# Patient Record
Sex: FEMALE | Race: WHITE | URBAN - METROPOLITAN AREA
[De-identification: names, ages, dates, MRNs, and addresses within clinical notes are randomized per-mention and may not be internally consistent; named-entity substitution may affect disease eponyms.]

---

## 2018-02-16 ENCOUNTER — OFFICE VISIT (OUTPATIENT)
Dept: FAMILY MEDICINE CLINIC | Age: 28
End: 2018-02-16

## 2018-02-16 VITALS
TEMPERATURE: 98.1 F | BODY MASS INDEX: 18.61 KG/M2 | OXYGEN SATURATION: 98 % | HEIGHT: 64 IN | RESPIRATION RATE: 18 BRPM | DIASTOLIC BLOOD PRESSURE: 62 MMHG | SYSTOLIC BLOOD PRESSURE: 96 MMHG | HEART RATE: 70 BPM | WEIGHT: 109 LBS

## 2018-02-16 DIAGNOSIS — Z00.00 WELL WOMAN EXAM WITHOUT GYNECOLOGICAL EXAM: Primary | ICD-10-CM

## 2018-02-16 NOTE — PROGRESS NOTES
Jack Keller [address] [place] [address] Visit   02/16/2018  Patient ID: Malik Montgomery is a 32 y.o. female. Assessment/Plan:    Diagnoses and all orders for this visit:    1. Well woman exam without gynecological exam  #Healthcare maintenance/ Preventive:  - screened for alcohol abuse, counseled if heavy use   - screened for depression  - screened for tobacco use, counseled if active  - HM reviewed and updated as noted. - discussed diet and exercise  - recommended dental and vision screenings  - reviewed sexual history      Counselled pt on Patient health concerns and plans. Patient was offered a choice/choices in the treatment plan today. Reviewed return precautions as appropriate. Patient expresses understanding of the plan and agrees with recommendations. See patient instructions for more. Patient Instructions     . TODAY, please go to:     [address], pap smear     Please schedule the following appointments at [address] [place] [address]:  · Complete Physical Exam and lab follow up with Dr. Mannie Cho in Feb 2019   · Lab visit, fasting, the week before our visit.       Today's Plan:      Decrease your risk for heart attack and stroke    Better Blood Pressure  Be Active  Better Cholesterol  Eat better  Better Blood sugar  Lose weight   Stop smoking                 American Heart Association DASH  (Dietary Approaches to Stop Hypertension) Mediterranean   Eat Daily:   4+ fruit and vegetables  3+ fiber rich foods  < 1500mg sodium  <7% sat fat    Weekly:   2+ fish  4+ flat handfuls of nets  <36 oz sweetened beverages  <2 svgs processed meats   Daily:   7-8 grains and grain products  4-5 fruit   4-5 vegetable  2-3 low fat or non fat dairy  2 or less lean meat, fish, poultry  <2-4 fats and sweets    Weekly:   4-5 nuts, seeds, legumes   Whole grains  7-10 fruit and vegetables  Eat nuts  Low or non fat dairy  Red wine  Use olive or canola oil  Use spices rather than salt    Weekly:   Fish  2x  Poultry 2x    Monthly:   Red meat     Move 150 minutes of moderate activity per week Exercise Exercise    Other No tobacco  BMI<25  Total cholesterol <200  Blood Pressure <120/80  Fasting blood glucose <100   Weight loss  Stop smoking  Moderation of alcohol   For more information, go to: [email or web address]. [email or web address]-[email or web address]  [email or web address].org/[email or web address]   [email or web address]         Subjective:   HPI:  Chintan Jimenez is a 32 y.o. female being seen for:   Chief Complaint   Patient presents with   [address]  Past medical history, surgical history, social history, family history, medications, allergies reviewed and updated. Prior PCP: none    Complete Physical today  Past medical history, surgical history, social history, family history, medications, allergies reviewed and updated. reports that she has never smoked. She has never used smokeless tobacco.   reports that she drinks alcohol. · OB Hx/Menstrual Hx/Sexualhx:   reports that she does not currently engage in sexual activity. OB History    Para Term  AB Living   0 0 0 0 0 0   SAB TAB Ectopic Molar Multiple Live Births   0 0 0 0 0 0         Obstetric Comments   . Menarche: middle school   Flow: regular, monthly    H/o abnl pap: per pt, as of 2018 Yes. \"white cells\" \"did some tests\" ultimately okay. Has had normal since then   Paps with [address]     Patient's last menstrual period was 2018.       Health Maintenance  Health Maintenance Due   Topic Date Due    PAP AKA CERVICAL CYTOLOGY  10/18/2011     · Depression:     PHQ over the last two weeks 2018   Little interest or pleasure in doing things Not at all   Feeling down, depressed or hopeless Not at all   Total Score PHQ 2 0       Screening and Prevention Due:  Health Maintenance Due   Topic Date Due    PAP AKA CERVICAL CYTOLOGY  10/18/2011         Review of Systems  Otherwise as noted in HPI  ? Objective:     Visit Vitals    BP 96/62 (BP 1 Location: Left arm, BP Patient Position: Sitting)    Pulse 70    Temp 98.1 °F (36.7 °C) (Oral)    Resp 18    Ht 5' 4\" (1.626 m)    Wt 109 lb (49.4 kg)    SpO2 98%    BMI 18.71 kg/m2     Wt Readings from Last 3 Encounters:   02/16/18 109 lb (49.4 kg)     BP Readings from Last 3 Encounters:   02/16/18 96/62     PHQ over the last two weeks 2/16/2018   Little interest or pleasure in doing things Not at all   Feeling down, depressed or hopeless Not at all   Total Score PHQ 2 0         Physical Exam   Constitutional: She appears well-developed and well-nourished. No distress. HENT:   Right Ear: Tympanic membrane, external ear and ear canal normal.   Left Ear: Tympanic membrane, external ear and ear canal normal.   Mouth/Throat: Oropharynx is clear and moist. No oropharyngeal exudate. Eyes: Conjunctivae are normal. Pupils are equal, round, and reactive to light. Right eye exhibits no discharge. Left eye exhibits no discharge. No scleral icterus. Neck: Neck supple. No thyromegaly present. Cardiovascular: Normal rate, regular rhythm and normal heart sounds. Exam reveals no gallop and no friction rub. No murmur heard. Pulmonary/Chest: Effort normal and breath sounds normal. No respiratory distress. She has no decreased breath sounds. She has no wheezes. She has no rhonchi. She has no rales. Abdominal: Soft. Bowel sounds are normal. She exhibits no distension and no mass. There is no hepatosplenomegaly. There is no tenderness. There is no rigidity, no rebound and no guarding. Musculoskeletal: She exhibits no edema. Lymphadenopathy:     She has no cervical adenopathy. Right: No supraclavicular adenopathy present. Left: No supraclavicular adenopathy present. Neurological: She is alert.  She has normal strength and normal reflexes. No sensory deficit. She exhibits normal muscle tone. Skin: She is not diaphoretic. Psychiatric: She has a normal mood and affect. Her behavior is normal.       Allergies   Allergen Reactions    Pear Itching     Peaches, pineapple, plums. Corners of mouth start to itch     Prior to Admission medications    Medication Sig Start Date End Date Taking? Authorizing Provider   LACTOBACILLUS ACIDOPHILUS (PROBIOTIC PO) Take  by mouth. Yes Historical Provider   CETIRIZINE HCL (ZYRTEC PO) Take  by mouth. Yes Historical Provider     Patient Active Problem List   Diagnosis Code    Hypoglycemia E16.2    Environmental allergies Z91.09    Multiple food allergies Z91.018       . Social History     Social History    Marital status: UNKNOWN     Spouse name: N/A    Number of children: N/A    Years of education: N/A     Occupational History    Not on file. Social History Main Topics    Smoking status: Never Smoker    Smokeless tobacco: Never Used    Alcohol use Yes      Comment: occassionally     Drug use: No    Sexual activity: Not Currently     Other Topics Concern    Not on file     Social History Narrative    . Went to [employer] for [employer], then transferred to [employer]. Lives with mom and dad. Past Medical History:   Diagnosis Date    Environmental allergies     received allergy shots. stopped in college. doing well since then. takes zyrtec. pollen, dust, molds, bermuda grass, cats (can build tolerance), horses.  Hypoglycemia     if not enough protein or protein<sugar, gets headache, hunger, weak, irritability. improves with protein. manages with advil for headaches. can sometimes cause migraine. more susceptible with stress and menses.  Multiple food allergies     no h/o anaphylaxis.         Past Surgical History:   Procedure Laterality Date    HX TONSILLECTOMY  child    HX WISDOM TEETH EXTRACTION  2009       OB History  Para Term  AB Living    0 0 0 0 0 0    SAB TAB Ectopic Molar Multiple Live Births    0 0 0 0 0 0        Obstetric Comments    . Menarche: middle school  Flow: regular, monthly   H/o abnl pap: per pt, as of 2018 Yes. \"white cells\" \"did some tests\" ultimately okay.  Has had normal since then  Paps with [facility] [address] History   Problem Relation Age of Onset    Other Mother      hypoglycemia    Allergy-severe Father      improved with lifestyle changes    Attention Deficit Hyperactivity Disorder Sister     Other Sister      hypoglycemia    No Known Problems Brother     Colon Cancer Maternal Grandmother      35s    Other Maternal Grandfather      Sjogren's Syndrome    Dementia Maternal Grandfather      76s    Stroke Maternal Grandfather     Other Paternal Grandmother      sinus problems, arthritis    Diabetes Paternal Grandfather     Arthritis-osteo Paternal Rojean Lowell     Stroke Paternal Grandfather

## 2018-02-16 NOTE — MR AVS SNAPSHOT
[address] 
[address] [facility] [address] 
[phone] Patient: Dalia Rosario MRN: [id number] :1990 Visit Information Date & Time Provider Department Dept. Phone Encounter #  
 2018  3:40 PM Juanita Forbes. Blaze Irvin MD [facility] [phone] [id number] Upcoming Health Maintenance Date Due  
 PAP AKA CERVICAL CYTOLOGY 10/18/2011 DTaP/Tdap/Td series (2 - Td) 2028 Allergies as of 2018  Review Complete On: 2018 By: Juanita Forbes. Blaze Irvin MD  
  
 Severity Noted Reaction Type Reactions Pear  2018    Itching Peaches, pineapple, plums. Corners of mouth start to itch Current Immunizations  Never Reviewed No immunizations on file. Not reviewed this visit You Were Diagnosed With   
  
 Codes Comments Well woman exam without gynecological exam    -  Primary ICD-10-CM: Z00.00 ICD-9-CM: V70.0 Hypoglycemia     ICD-10-CM: E16.2 ICD-9-CM: 251.2 Environmental allergies     ICD-10-CM: Z91.09 
ICD-9-CM: V15.09   
 Multiple food allergies     ICD-10-CM: Z91.018 
ICD-9-CM: V15.05 Vitals BP Pulse Temp Resp Height(growth percentile) Weight(growth percentile) 96/62 (BP 1 Location: Left arm, BP Patient Position: Sitting) 70 98.1 °F (36.7 °C) (Oral) 18 5' 4\" (1.626 m) 109 lb (49.4 kg) LMP SpO2 BMI Smoking Status 2018 98% 18.71 kg/m2 Never Smoker BMI and BSA Data Body Mass Index Body Surface Area 18.71 kg/m 2 1.49 m 2 Preferred Pharmacy Pharmacy Name Phone [id number] [address] [phone] Your Updated Medication List  
  
   
This list is accurate as of: 18  5:12 PM.  Always use your most recent med list.  
  
  
  
  
 PROBIOTIC PO Take  by mouth. ZYRTEC PO Take  by mouth. Patient Instructions Mimi Mckeon TODAY, please go to: CHECK OUT Release of records- OBGYN, pap smear Please schedule the following appointments at [address] [place] [address]: 
· Complete Physical Exam and lab follow up with Dr. Yeyo Zamora in Feb 2019 · Lab visit, fasting, the week before our visit. Today's Plan: 
 
 
Decrease your risk for heart attack and stroke Better Blood Pressure Be Active Better Cholesterol Eat better Better Blood sugar Lose weight Stop smoking American Heart Association DASH 
(Dietary Approaches to Stop Hypertension) Mediterranean Eat Daily:  
4+ fruit and vegetables 3+ fiber rich foods < 1500mg sodium 
<7% sat fat Weekly:  
2+ fish 4+ flat handfuls of nets 
<36 oz sweetened beverages <2 svgs processed meats Daily:  
7-8 grains and grain products 4-5 fruit 4-5 vegetable 2-3 low fat or non fat dairy 2 or less lean meat, fish, poultry <2-4 fats and sweets Weekly:  
4-5 nuts, seeds, legumes Whole grains 7-10 fruit and vegetables Eat nuts Low or non fat dairy Red wine Use olive or canola oil Use spices rather than salt Weekly:  
Fish  2x Poultry 2x Monthly:  
Red meat Move 150 minutes of moderate activity per week Exercise Exercise Other No tobacco 
BMI<25 Total cholesterol <200 Blood Pressure <120/80 Fasting blood glucose <100 Weight loss Stop smoking Moderation of alcohol For more information, go to: [email or web address].App Partner.[email or web address]. WX-bGhXyuCg 
[email or web address].org/default. asp  
[email or web address].de Well Visit, Ages 25 to 48: Care Instructions Your Care Instructions Physical exams can help you stay healthy. Your doctor has checked your overall health and may have suggested ways to take good care of yourself. He or she also may have recommended tests. At home, you can help prevent illness with healthy eating, regular exercise, and other steps. Follow-up care is a key part of your treatment and safety. Be sure to make and go to all appointments, and call your doctor if you are having problems. It's also a good idea to know your test results and keep a list of the medicines you take. How can you care for yourself at home? · Reach and stay at a healthy weight. This will lower your risk for many problems, such as obesity, diabetes, heart disease, and high blood pressure. · Get at least 30 minutes of physical activity on most days of the week. Walking is a good choice. You also may want to do other activities, such as running, swimming, cycling, or playing tennis or team sports. Discuss any changes in your exercise program with your doctor. · Do not smoke or allow others to smoke around you. If you need help quitting, talk to your doctor about stop-smoking programs and medicines. These can increase your chances of quitting for good. · Talk to your doctor about whether you have any risk factors for sexually transmitted infections (STIs). Having one sex partner (who does not have STIs and does not have sex with anyone else) is a good way to avoid these infections. · Use birth control if you do not want to have children at this time. Talk with your doctor about the choices available and what might be best for you. · Protect your skin from too much sun. When you're outdoors from 10 a.m. to 4 p.m., stay in the shade or cover up with clothing and a hat with a wide brim. Wear sunglasses that block UV rays. Even when it's cloudy, put broad-spectrum sunscreen (SPF 30 or higher) on any exposed skin. · See a dentist one or two times a year for checkups and to have your teeth cleaned. · Wear a seat belt in the car. · Drink alcohol in moderation, if at all.  That means no more than 2 drinks a day for men and 1 drink a day for women. Follow your doctor's advice about when to have certain tests. These tests can spot problems early. For everyone · Cholesterol. Have the fat (cholesterol) in your blood tested after age 21. Your doctor will tell you how often to have this done based on your age, family history, or other things that can increase your risk for heart disease. · Blood pressure. Have your blood pressure checked during a routine doctor visit. Your doctor will tell you how often to check your blood pressure based on your age, your blood pressure results, and other factors. · Vision. Talk with your doctor about how often to have a glaucoma test. 
· Diabetes. Ask your doctor whether you should have tests for diabetes. · Colon cancer. Have a test for colon cancer at age 48. You may have one of several tests. If you are younger than 48, you may need a test earlier if you have any risk factors. Risk factors include whether you already had a precancerous polyp removed from your colon or whether your parent, brother, sister, or child has had colon cancer. For women · Breast exam and mammogram. Talk to your doctor about when you should have a clinical breast exam and a mammogram. Medical experts differ on whether and how often women under 50 should have these tests. Your doctor can help you decide what is right for you. · Pap test and pelvic exam. Begin Pap tests at age 24. A Pap test is the best way to find cervical cancer. The test often is part of a pelvic exam. Ask how often to have this test. 
· Tests for sexually transmitted infections (STIs). Ask whether you should have tests for STIs. You may be at risk if you have sex with more than one person, especially if your partners do not wear condoms. For men · Tests for sexually transmitted infections (STIs). Ask whether you should have tests for STIs.  You may be at risk if you have sex with more than one person, especially if you do not wear a condom. · Testicular cancer exam. Ask your doctor whether you should check your testicles regularly. · Prostate exam. Talk to your doctor about whether you should have a blood test (called a PSA test) for prostate cancer. Experts differ on whether and when men should have this test. Some experts suggest it if you are older than 39 and are -American or have a father or brother who got prostate cancer when he was younger than 72. When should you call for help? Watch closely for changes in your health, and be sure to contact your doctor if you have any problems or symptoms that concern you. Where can you learn more? Go to http://josé manuel-nigel.info/. Enter P072 in the search box to learn more about \"Well Visit, Ages 25 to 48: Care Instructions. \" Current as of: May 12, 2017 Content Version: 11.4 © 2859-1481 [employer]. Care instructions adapted under license by [employer] (which disclaims liability or warranty for this information). If you have questions about a medical condition or this instruction, always ask your healthcare professional. [address] any warranty or liability for your use of this information. Introducing hospitals & HEALTH SERVICES! Lolly Chun introduces Valopaa patient portal. Now you can access parts of your medical record, email your doctor's office, and request medication refills online. 1. In your internet browser, go to [email or web address] 2. Click on the First Time User? Click Here link in the Sign In box. You will see the New Member Sign Up page. 3. Enter your Valopaa Access Code exactly as it appears below. You will not need to use this code after youve completed the sign-up process. If you do not sign up before the expiration date, you must request a new code. · Valopaa Access Code: [email or web address] Expires: 5/17/2018  4:11 PM 
 
 4. Enter the last four digits of your Social Security Number (xxxx) and Date of Birth (mm/dd/yyyy) as indicated and click Submit. You will be taken to the next sign-up page. 5. Create a [email or web address] ID. This will be your [email or web address] login ID and cannot be changed, so think of one that is secure and easy to remember. 6. Create a [email or web address] password. You can change your password at any time. 7. Enter your Password Reset Question and Answer. This can be used at a later time if you forget your password. 8. Enter your e-mail address. You will receive e-mail notification when new information is available in [address] 9. Click Sign Up. You can now view and download portions of your medical record. 10. Click the Download Summary menu link to download a portable copy of your medical information. If you have questions, please visit the Frequently Asked Questions section of the [email or web address] website. Remember, [email or web address] is NOT to be used for urgent needs. For medical emergencies, dial 911. Now available from your iPhone and Android! Please provide this summary of care documentation to your next provider. Your primary care clinician is listed as Yissel Coello. If you have any questions after today's visit, please call [phone].

## 2018-02-16 NOTE — PATIENT INSTRUCTIONS
Wallace Kumar TODAY, please go to:     [address], pap smear     Please schedule the following appointments at [address] [place] [address]:  · Complete Physical Exam and lab follow up with Dr. Harsh Malin in Feb 2019   · Lab visit, fasting, the week before our visit. Today's Plan:      Decrease your risk for heart attack and stroke    Better Blood Pressure  Be Active  Better Cholesterol  Eat better  Better Blood sugar  Lose weight   Stop smoking                 American Heart Association DASH  (Dietary Approaches to Stop Hypertension) Mediterranean   Eat Daily:   4+ fruit and vegetables  3+ fiber rich foods  < 1500mg sodium  <7% sat fat    Weekly:   2+ fish  4+ flat handfuls of nets  <36 oz sweetened beverages  <2 svgs processed meats   Daily:   7-8 grains and grain products  4-5 fruit   4-5 vegetable  2-3 low fat or non fat dairy  2 or less lean meat, fish, poultry  <2-4 fats and sweets    Weekly:   4-5 nuts, seeds, legumes   Whole grains  7-10 fruit and vegetables  Eat nuts  Low or non fat dairy  Red wine  Use olive or canola oil  Use spices rather than salt    Weekly:   Fish  2x  Poultry 2x    Monthly:   Red meat     Move 150 minutes of moderate activity per week Exercise Exercise    Other No tobacco  BMI<25  Total cholesterol <200  Blood Pressure <120/80  Fasting blood glucose <100   Weight loss  Stop smoking  Moderation of alcohol   For more information, go to: [email or web address]  [email or web address].[email or web address]   [email or web address]            Well Visit, Ages 25 to 48: Care Instructions  Your Care Instructions    Physical exams can help you stay healthy. Your doctor has checked your overall health and may have suggested ways to take good care of yourself. He or she also may have recommended tests.  At home, you can help prevent illness with healthy eating, regular exercise, and other steps. Follow-up care is a key part of your treatment and safety. Be sure to make and go to all appointments, and call your doctor if you are having problems. It's also a good idea to know your test results and keep a list of the medicines you take. How can you care for yourself at home? · Reach and stay at a healthy weight. This will lower your risk for many problems, such as obesity, diabetes, heart disease, and high blood pressure. · Get at least 30 minutes of physical activity on most days of the week. Walking is a good choice. You also may want to do other activities, such as running, swimming, cycling, or playing tennis or team sports. Discuss any changes in your exercise program with your doctor. · Do not smoke or allow others to smoke around you. If you need help quitting, talk to your doctor about stop-smoking programs and medicines. These can increase your chances of quitting for good. · Talk to your doctor about whether you have any risk factors for sexually transmitted infections (STIs). Having one sex partner (who does not have STIs and does not have sex with anyone else) is a good way to avoid these infections. · Use birth control if you do not want to have children at this time. Talk with your doctor about the choices available and what might be best for you. · Protect your skin from too much sun. When you're outdoors from 10 a.m. to 4 p.m., stay in the shade or cover up with clothing and a hat with a wide brim. Wear sunglasses that block UV rays. Even when it's cloudy, put broad-spectrum sunscreen (SPF 30 or higher) on any exposed skin. · See a dentist one or two times a year for checkups and to have your teeth cleaned. · Wear a seat belt in the car. · Drink alcohol in moderation, if at all. That means no more than 2 drinks a day for men and 1 drink a day for women.   Follow your doctor's advice about when to have certain tests. These tests can spot problems early. For everyone  · Cholesterol. Have the fat (cholesterol) in your blood tested after age 21. Your doctor will tell you how often to have this done based on your age, family history, or other things that can increase your risk for heart disease. · Blood pressure. Have your blood pressure checked during a routine doctor visit. Your doctor will tell you how often to check your blood pressure based on your age, your blood pressure results, and other factors. · Vision. Talk with your doctor about how often to have a glaucoma test.  · Diabetes. Ask your doctor whether you should have tests for diabetes. · Colon cancer. Have a test for colon cancer at age 48. You may have one of several tests. If you are younger than 48, you may need a test earlier if you have any risk factors. Risk factors include whether you already had a precancerous polyp removed from your colon or whether your parent, brother, sister, or child has had colon cancer. For women  · Breast exam and mammogram. Talk to your doctor about when you should have a clinical breast exam and a mammogram. Medical experts differ on whether and how often women under 50 should have these tests. Your doctor can help you decide what is right for you. · Pap test and pelvic exam. Begin Pap tests at age 24. A Pap test is the best way to find cervical cancer. The test often is part of a pelvic exam. Ask how often to have this test.  · Tests for sexually transmitted infections (STIs). Ask whether you should have tests for STIs. You may be at risk if you have sex with more than one person, especially if your partners do not wear condoms. For men  · Tests for sexually transmitted infections (STIs). Ask whether you should have tests for STIs. You may be at risk if you have sex with more than one person, especially if you do not wear a condom.   · Testicular cancer exam. Ask your doctor whether you should check your testicles regularly. · Prostate exam. Talk to your doctor about whether you should have a blood test (called a PSA test) for prostate cancer. Experts differ on whether and when men should have this test. Some experts suggest it if you are older than 39 and are -American or have a father or brother who got prostate cancer when he was younger than 72. When should you call for help? Watch closely for changes in your health, and be sure to contact your doctor if you have any problems or symptoms that concern you. Where can you learn more? Go to http://josé manuel-nigel.info/. Enter P072 in the search box to learn more about \"Well Visit, Ages 25 to 48: Care Instructions. \"  Current as of: May 12, 2017  Content Version: 11.4  © 2469-5349 [employer]. Care instructions adapted under license by [employer] (which disclaims liability or warranty for this information). If you have questions about a medical condition or this instruction, always ask your healthcare professional. [place] [address] any warranty or liability for your use of this information.

## 2018-02-16 NOTE — PROGRESS NOTES
Chief Complaint   Patient presents with   [facility] Establish Care     1. Have you been to the ER, urgent care clinic since your last visit? Hospitalized since your last visit? No     2. Have you seen or consulted any other health care providers outside of the [address] [place] [address] since your last visit? Include any pap smears or colon screening. No     The patient was counseled on the dangers of tobacco use, and was advised never to start. Reviewed strategies to maximize success, including never to start. Adenike Saha  Identified pt with two pt identifiers(name and ). Chief Complaint   Patient presents with   [facility] Establish Care       1. Have you been to the ER, urgent care clinic since your last visit? Hospitalized since your last visit? NO    2. Have you seen or consulted any other health care providers outside of the [address] [place] [address] since your last visit? Include any pap smears or colon screening. NO    Today's provider has been notified of reason for visit, vitals and flowsheets obtained on patients. Patient received paperwork for advance directive during previous visit but has not completed at this time     Reviewed record In preparation for visit, huddled with provider and have obtained necessary documentation      Health Maintenance Due   Topic    DTaP/Tdap/Td series (1 - Tdap) Discussed with patient today and advised to follow up.  PAP AKA CERVICAL CYTOLOGY Discussed with patient today and advised to follow up.  Influenza Age 5 to Adult Discussed with patient today and advised to follow up. Wt Readings from Last 3 Encounters:   No data found for Wt     Temp Readings from Last 3 Encounters:   No data found for Temp     BP Readings from Last 3 Encounters:   No data found for BP     Pulse Readings from Last 3 Encounters:   No data found for Pulse     There were no vitals filed for this visit. Learning Assessment:  :     No flowsheet data found.     Depression Screening:  :     PHQ over the last two weeks 2/16/2018   Little interest or pleasure in doing things Not at all   Feeling down, depressed or hopeless Not at all   Total Score PHQ 2 0       Fall Risk Assessment:  :     No flowsheet data found. Abuse Screening:  :     No flowsheet data found. ADL Screening:  :     No flowsheet data found. ACP is not on file, advised to return. Medication reconciliation up to date and corrected with patient at this time.

## 2018-09-10 ENCOUNTER — OFFICE VISIT (OUTPATIENT)
Dept: FAMILY MEDICINE CLINIC | Age: 28
End: 2018-09-10

## 2018-09-10 VITALS
HEIGHT: 64 IN | DIASTOLIC BLOOD PRESSURE: 60 MMHG | OXYGEN SATURATION: 99 % | SYSTOLIC BLOOD PRESSURE: 102 MMHG | WEIGHT: 111.9 LBS | TEMPERATURE: 97.7 F | HEART RATE: 79 BPM | BODY MASS INDEX: 19.1 KG/M2 | RESPIRATION RATE: 18 BRPM

## 2018-09-10 DIAGNOSIS — E16.2 HYPOGLYCEMIA: ICD-10-CM

## 2018-09-10 DIAGNOSIS — Z91.09 ENVIRONMENTAL ALLERGIES: ICD-10-CM

## 2018-09-10 DIAGNOSIS — Z91.018 MULTIPLE FOOD ALLERGIES: ICD-10-CM

## 2018-09-10 DIAGNOSIS — Z76.89 ENCOUNTER TO ESTABLISH CARE: Primary | ICD-10-CM

## 2018-09-10 NOTE — PATIENT INSTRUCTIONS
Food Allergy: Care Instructions  Your Care Instructions    When you have a food allergy and you eat that food, your body reacts as if the food is trying to cause harm. It fights back by setting off an allergic reaction. A mild reaction may include a few raised, red, itchy patches of skin (called hives). A severe reaction may cause hives all over, swelling in the throat, trouble breathing, or fainting. This is called anaphylaxis (say \"BEO-no-pre-LUZ MARINA-tim\"). It can be deadly. A good way to prevent an allergic reaction is to avoid the foods that cause it. An allergy doctor or a dietitian may be able to help you understand which foods might be okay and what to avoid. Learn what to do if you have a reaction. Follow-up care is a key part of your treatment and safety. Be sure to make and go to all appointments, and call your doctor if you are having problems. It's also a good idea to know your test results and keep a list of the medicines you take. How can you care for yourself at home? During a mild reaction  · Take an over-the-counter antihistamine, such as diphenhydramine (Benadryl) or loratadine (Claritin), as your doctor recommends. If you have a severe reaction, you also might be given one of these antihistamines. During a severe reaction  · Call for emergency help. A serious reaction is an emergency. · Give yourself an epinephrine shot. Make sure it is with you at all times. To prevent future reactions  · Avoid the foods that cause problems. And try not to use utensils or cookware that may have been in contact with food that you are allergic to. · Teach your family members, coworkers, and friends what to do if you have a severe reaction to a food that you are allergic to. · Wear medical alert jewelry that lists your allergies. You can buy this at most [employer]. When should you call for help?   Give an epinephrine shot if:    · You think you are having a severe allergic reaction.    After you give an epinephrine shot, call 911, even if you feel better.   [id number] anytime you think you may need emergency care. For example, call if:    · You have symptoms of a severe allergic reaction. These may include:  ¨ Sudden raised, red areas (hives) all over your body. ¨ Swelling of the throat, mouth, lips, or tongue. ¨ Trouble breathing. ¨ Passing out (losing consciousness). Or you may feel very lightheaded or suddenly feel weak, confused, or restless.     · You have been given an epinephrine shot, even if you feel better.    Call your doctor now or seek immediate medical care if:    · You have symptoms of an allergic reaction, such as:  ¨ A rash or hives (raised, red areas on the skin). ¨ Itching. ¨ Swelling. ¨ Belly pain, nausea, or vomiting.    Watch closely for changes in your health, and be sure to contact your doctor if:    · You do not get better as expected. Where can you learn more? Go to http://josé manuel-nigel.info/. Enter D891 in the search box to learn more about \"Food Allergy: Care Instructions. \"  Current as of: October 6, 2017  Content Version: 11.7  © 5843-8453 [employer]. Care instructions adapted under license by [employer] (which disclaims liability or warranty for this information). If you have questions about a medical condition or this instruction, always ask your healthcare professional. [place] [address] any warranty or liability for your use of this information. Hypoglycemia: Care Instructions  Your Care Instructions    Hypoglycemia means that your blood sugar is low and your body is not getting enough fuel. Some people get low blood sugar from not eating often enough. Some medicines to treat diabetes can cause low blood sugar. People who have had surgery on their stomachs or intestines may get hypoglycemia. Problems with the pancreas, kidneys, or liver also can cause low blood sugar.   A snack or drink with sugar in it will raise your blood sugar and should ease your symptoms right away. Your doctor may recommend that you change or stop your medicines until you can get your blood sugar levels under control. In the long run, you may need to change your diet and eating habits so that you get enough fuel for your body throughout the day. Follow-up care is a key part of your treatment and safety. Be sure to make and go to all appointments, and call your doctor if you are having problems. It's also a good idea to know your test results and keep a list of the medicines you take. How can you care for yourself at home? · Learn to recognize the early signs of low blood sugar. Signs include:  ¨ Nausea. ¨ Hunger. ¨ Feeling nervous, irritable, or shaky. ¨ Cold, clammy, wet skin. ¨ Sweating (when you are not exercising). ¨ A fast heartbeat. ¨ Numbness or tingling of the fingertips or lips. · If you feel an episode of low blood sugar coming on, drink fruit juice or sugared (not diet) soda, or eat sugar in the form of candy, cubes, or tablets. [employer] are another [employer]. · Eat small, frequent meals so that you do not get too hungry between meals. · Balance extra exercise with eating more. · Keep a written record of your low blood sugar episodes, including when you last ate and what you ate, so that you can learn what causes your blood sugar to drop. · Make sure your family, friends, and coworkers know the symptoms of low blood sugar and know what to do to get your sugar level up. · Wear medical alert jewelry that lists your condition. You can buy this at most [employer]Upper [employer]. When should you call for help? Call 911 anytime you think you may need emergency care.  For example, call if:    · You passed out (lost consciousness).     · You are confused or cannot think clearly.     · Your blood sugar is very high or very low.    Watch closely for changes in your health, and be sure to contact your doctor if:    · Your blood sugar stays outside the level your doctor set for you.     · You have any problems. Where can you learn more? Go to http://josé manuel-nigel.info/. Enter B303 in the search box to learn more about \"Hypoglycemia: Care Instructions. \"  Current as of: December 7, 2017  Content Version: 11.7  © 8490-9798 [employer]. Care instructions adapted under license by [employer] (which disclaims liability or warranty for this information). If you have questions about a medical condition or this instruction, always ask your healthcare professional. [address] any warranty or liability for your use of this information.

## 2018-09-10 NOTE — PROGRESS NOTES
Chief Complaint   Patient presents with    New Patient     Rm 6:  switching providers d/t Pcp leaving       HPI:  Anjum is a 32y.o. year old female who is a new patient and is here to establish care. She was previously followed by [facility], leaving the practice. Pt presents today with no concerns, she is feeling generally healthy. The following sections were reviewed & updated as appropriate: PMH, PSH, PL, FMH,  and SH. Past Medical History:   Diagnosis Date    Environmental allergies     received allergy shots. stopped in college. doing well since then. takes zyrtec. pollen, dust, molds, bermuda grass, cats (can build tolerance), horses.  Hypoglycemia     if not enough protein or protein<sugar, gets headache, hunger, weak, irritability. improves with protein. manages with advil for headaches. can sometimes cause migraine. more susceptible with stress and menses.  Multiple food allergies     no h/o anaphylaxis.         Past Surgical History:   Procedure Laterality Date    HX TONSILLECTOMY  child    HX WISDOM TEETH EXTRACTION  2009    HX WISDOM TEETH EXTRACTION N/A 2009       Patient Active Problem List   Diagnosis Code    Hypoglycemia E16.2    Environmental allergies Z91.09    Multiple food allergies Z80.200        Family History   Problem Relation Age of Onset    Other Mother      hypoglycemia    Allergy-severe Father      improved with lifestyle changes    Attention Deficit Hyperactivity Disorder Sister     Other Sister      hypoglycemia    No Known Problems Brother     Colon Cancer Maternal Grandmother      35s    Other Maternal Grandfather      Sjogren's Syndrome    Dementia Maternal Grandfather      76s    Stroke Maternal Grandfather     Other Paternal Grandmother      sinus problems, arthritis    Diabetes Paternal Grandfather     Arthritis-osteo Paternal Grandfather     Stroke Paternal Grandfather        Social History     Social History    Marital status: UNKNOWN     Spouse name: N/A    Number of children: N/A    Years of education: N/A     Occupational History    Not on file. Social History Main Topics    Smoking status: Never Smoker    Smokeless tobacco: Never Used    Alcohol use Yes      Comment: occassionally     Drug use: No    Sexual activity: Not Currently     Other Topics Concern    Not on file     Social History Narrative    . Went to [employer] [facility] [employer] for [employer], then transferred to [employer]. Lives with mom and dad. Prior to Admission medications    Medication Sig Start Date End Date Taking? Authorizing Provider   CETIRIZINE HCL (ZYRTEC PO) Take  by mouth. Yes Historical Provider        Allergies   Allergen Reactions    Pear Itching     Peaches, pineapple, plums. Corners of mouth start to itch          Review of Systems  A comprehensive review of systems was negative except for that written in the HPI. Objective:       Visit Vitals    /60 (BP 1 Location: Right arm, BP Patient Position: Sitting)    Pulse 79    Temp 97.7 °F (36.5 °C) (Temporal)    Resp 18    Ht 5' 4\" (1.626 m)    Wt 111 lb 14.4 oz (50.8 kg)    LMP 09/08/2018 (Exact Date)    SpO2 99%    BMI 19.21 kg/m2       Physical Exam  Gen: alert, oriented, no acute distress  Head: normocephalic, atraumatic  Ears: external auditory canals clear, TMs without erythema or effusion  Eyes: pupils equal round reactive to light, sclera clear, conjunctiva clear  Oral: moist mucus membranes, no oral lesions, no pharyngeal inflammation or exudate  Neck: symmetric normal sized thyroid, no carotid bruits, no jugular vein distention  Resp: no increase work of breathing, lungs clear to ausculation bilaterally, no wheezing, rales or rhonchi  CV: S1, S2 normal.  No murmurs, rubs, or gallops. Abd: soft, not tender, not distended. No hepatosplenomegaly. Normal bowel sounds. No hernias. No abdominal or renal bruits.   Neuro: cranial nerves intact, normal strength and movement in all extremities, reflexes and sensation intact and symmetric. Skin: no lesion or rash  Extremities: no cyanosis or edema    Assessment & Plan:    ICD-10-CM ICD-9-CM    1. Encounter to establish care Z76.89 V65.8    2. Hypoglycemia E16.2 251.2    3. Multiple food allergies Z91.018 V15.05    4. Environmental allergies Z91.09 V15.09      Follow-up Disposition:  Return in about 6 months (around 3/10/2019) for Annual PE w/ labs. reviewed diet, exercise and weight control  reviewed medications and side effects in detail    Differential diagnosis and treatment options reviewed with patient who is in agreement with treatment plan as outlined below. Health Maintenance reviewed - UTD, awaiting OB records for Pap, declined flu vaccine. Recommended healthy diet low in carbohydrates, fats, sodium and cholesterol. Recommended regular cardiovascular exercise 3-6 times per week for 30-60 minutes daily. Chart is reviewed and updated today in the office. Records requested for other providers patient has seen and is currently seeing. Patient was offered a choice/choices in the treatment plan today. Patient expresses understanding of the plan and agrees with recommendations. Verbal and written instructions (see AVS) provided. See patient instructions for more. Patient expresses understanding of diagnosis and treatment plan.

## 2018-09-10 NOTE — MR AVS SNAPSHOT
[facility] 
 
 
 [address] [facility] [address] 
[address] [place] [address] 
[phone] Patient: Florentino Rojas MRN: [id number] :1990 Visit Information Date & Time Provider Department Dept. Phone Encounter #  
 9/10/2018  3:30 PM Sara Chou NP [facility] [id number] Follow-up Instructions Return in about 6 months (around 3/10/2019) for Annual PE w/ labs. Upcoming Health Maintenance Date Due Influenza Age 5 to Adult 10/31/2018* PAP AKA CERVICAL CYTOLOGY 2020 DTaP/Tdap/Td series (2 - Td) 2028 *Topic was postponed. The date shown is not the original due date. Allergies as of 9/10/2018  Review Complete On: 9/10/2018 By: Sara Chou NP Severity Noted Reaction Type Reactions Pear  2018    Itching Peaches, pineapple, plums. Corners of mouth start to itch Current Immunizations  Reviewed on 9/10/2018 No immunizations on file. Reviewed by Leonides Jones LPN on 6152 at  3:48 PM  
You Were Diagnosed With   
  
 Codes Comments Encounter to establish care    -  Primary ICD-10-CM: Z76.89 
ICD-9-CM: V65.8 Hypoglycemia     ICD-10-CM: E16.2 ICD-9-CM: 251.2 Multiple food allergies     ICD-10-CM: Z91.018 
ICD-9-CM: V15.05 Environmental allergies     ICD-10-CM: Z91.09 
ICD-9-CM: V15.09 Vitals BP Pulse Temp Resp Height(growth percentile) Weight(growth percentile) 102/60 (BP 1 Location: Right arm, BP Patient Position: Sitting) 79 97.7 °F (36.5 °C) (Temporal) 18 5' 4\" (1.626 m) 111 lb 14.4 oz (50.8 kg) LMP SpO2 BMI OB Status Smoking Status 2018 (Exact Date) 99% 19.21 kg/m2 Having regular periods Never Smoker BMI and BSA Data Body Mass Index Body Surface Area  
 19.21 kg/m 2 1.51 m 2 Preferred Pharmacy Pharmacy Name Phone  [id number] [address] Yvonne Lundberg [phone] Your Updated Medication List  
  
   
This list is accurate as of 9/10/18  4:58 PM.  Always use your most recent med list.  
  
  
  
  
 ZYRTEC PO Take  by mouth. Follow-up Instructions Return in about 6 months (around 3/10/2019) for Annual PE w/ labs. Patient Instructions Food Allergy: Care Instructions Your Care Instructions When you have a food allergy and you eat that food, your body reacts as if the food is trying to cause harm. It fights back by setting off an allergic reaction. A mild reaction may include a few raised, red, itchy patches of skin (called hives). A severe reaction may cause hives all over, swelling in the throat, trouble breathing, or fainting. This is called anaphylaxis (say \"DMU-yx-fva-LUZ MARINA-tim\"). It can be deadly. A good way to prevent an allergic reaction is to avoid the foods that cause it. An allergy doctor or a dietitian may be able to help you understand which foods might be okay and what to avoid. Learn what to do if you have a reaction. Follow-up care is a key part of your treatment and safety. Be sure to make and go to all appointments, and call your doctor if you are having problems. It's also a good idea to know your test results and keep a list of the medicines you take. How can you care for yourself at home? During a mild reaction · Take an over-the-counter antihistamine, such as diphenhydramine (Benadryl) or loratadine (Claritin), as your doctor recommends. If you have a severe reaction, you also might be given one of these antihistamines. During a severe reaction · Call for emergency help. A serious reaction is an emergency. · Give yourself an epinephrine shot. Make sure it is with you at all times. To prevent future reactions · Avoid the foods that cause problems. And try not to use utensils or cookware that may have been in contact with food that you are allergic to. · Teach your family members, coworkers, and friends what to do if you have a severe reaction to a food that you are allergic to. · Wear medical alert jewelry that lists your allergies. You can buy this at most drugstores. When should you call for help? Give an epinephrine shot if: 
  · You think you are having a severe allergic reaction.  
 After you give an epinephrine shot, call 911, even if you feel better. 
 [id number] anytime you think you may need emergency care. For example, call if: 
  · You have symptoms of a severe allergic reaction. These may include: 
¨ Sudden raised, red areas (hives) all over your body. ¨ Swelling of the throat, mouth, lips, or tongue. ¨ Trouble breathing. ¨ Passing out (losing consciousness). Or you may feel very lightheaded or suddenly feel weak, confused, or restless.  
  · You have been given an epinephrine shot, even if you feel better.  
 Call your doctor now or seek immediate medical care if: 
  · You have symptoms of an allergic reaction, such as: ¨ A rash or hives (raised, red areas on the skin). ¨ Itching. ¨ Swelling. ¨ Belly pain, nausea, or vomiting.  
 Watch closely for changes in your health, and be sure to contact your doctor if: 
  · You do not get better as expected. Where can you learn more? Go to http://josé manuel-nigel.info/. Enter V817 in the search box to learn more about \"Food Allergy: Care Instructions. \" Current as of: October 6, 2017 Content Version: 11.7 © 9526-4288 Healthwise, Incorporated. Care instructions adapted under license by [employer] (which disclaims liability or warranty for this information). If you have questions about a medical condition or this instruction, always ask your healthcare professional. [place] [address] any warranty or liability for your use of this information. Hypoglycemia: Care Instructions Your Care Instructions Hypoglycemia means that your blood sugar is low and your body is not getting enough fuel. Some people get low blood sugar from not eating often enough. Some medicines to treat diabetes can cause low blood sugar. People who have had surgery on their stomachs or intestines may get hypoglycemia. Problems with the pancreas, kidneys, or liver also can cause low blood sugar. A snack or drink with sugar in it will raise your blood sugar and should ease your symptoms right away. Your doctor may recommend that you change or stop your medicines until you can get your blood sugar levels under control. In the long run, you may need to change your diet and eating habits so that you get enough fuel for your body throughout the day. Follow-up care is a key part of your treatment and safety. Be sure to make and go to all appointments, and call your doctor if you are having problems. It's also a good idea to know your test results and keep a list of the medicines you take. How can you care for yourself at home? · Learn to recognize the early signs of low blood sugar. Signs include: 
¨ Nausea. ¨ Hunger. ¨ Feeling nervous, irritable, or shaky. ¨ Cold, clammy, wet skin. ¨ Sweating (when you are not exercising). ¨ A fast heartbeat. ¨ Numbness or tingling of the fingertips or lips. · If you feel an episode of low blood sugar coming on, drink fruit juice or sugared (not diet) soda, or eat sugar in the form of candy, cubes, or tablets. [employer] are another [employer]. · Eat small, frequent meals so that you do not get too hungry between meals. · Balance extra exercise with eating more. · Keep a written record of your low blood sugar episodes, including when you last ate and what you ate, so that you can learn what causes your blood sugar to drop. · Make sure your family, friends, and coworkers know the symptoms of low blood sugar and know what to do to get your sugar level up. · Wear medical alert jewelry that lists your condition. You can buy this at most drugstores. When should you call for help? Call 911 anytime you think you may need emergency care. For example, call if: 
  · You passed out (lost consciousness).  
  · You are confused or cannot think clearly.  
  · Your blood sugar is very high or very low.  
 Watch closely for changes in your health, and be sure to contact your doctor if: 
  · Your blood sugar stays outside the level your doctor set for you.  
  · You have any problems. Where can you learn more? Go to http://josé manuel-nigel.info/. Enter N756 in the search box to learn more about \"Hypoglycemia: Care Instructions. \" Current as of: December 7, 2017 Content Version: 11.7 © 9783-2846 [employer]. Care instructions adapted under license by [employer] (which disclaims liability or warranty for this information). If you have questions about a medical condition or this instruction, always ask your healthcare professional. [place] [address] any warranty or liability for your use of this information. Introducing [facility] [place] [facility]! [facility] introduces Spaces 2 Host patient portal. Now you can access parts of your medical record, email your doctor's office, and request medication refills online. 1. In your internet browser, go to [email or web address]. [email or web address] 2. Click on the First Time User? Click Here link in the Sign In box. You will see the New Member Sign Up page. 3. Enter your Spaces 2 Host Access Code exactly as it appears below. You will not need to use this code after youve completed the sign-up process. If you do not sign up before the expiration date, you must request a new code. · Spaces 2 Host Access Code: [email or web address] Expires: 12/9/2018  4:57 PM 
 
4.  Enter the last four digits of your Social Security Number (xxxx) and Date of Birth (mm/dd/yyyy) as indicated and click Submit. You will be taken to the next sign-up page. 5. Create a pyco ID. This will be your pyco login ID and cannot be changed, so think of one that is secure and easy to remember. 6. Create a pyco password. You can change your password at any time. 7. Enter your Password Reset Question and Answer. This can be used at a later time if you forget your password. 8. Enter your e-mail address. You will receive e-mail notification when new information is available in [address] 9. Click Sign Up. You can now view and download portions of your medical record. 10. Click the Download Summary menu link to download a portable copy of your medical information. If you have questions, please visit the Frequently Asked Questions section of the pyco website. Remember, pyco is NOT to be used for urgent needs. For medical emergencies, dial 911. Now available from your iPhone and Android! Please provide this summary of care documentation to your next provider. Your primary care clinician is listed as Trinh Fuller. Gaston Huggins. If you have any questions after today's visit, please call [phone].

## 2018-09-10 NOTE — PROGRESS NOTES
[place]  Identified pt with two pt identifiers(name and ). Chief Complaint   Patient presents with    New Patient     Rm 6:  switching providers d/t Pcp leaving         1. Have you been to the ER, urgent care clinic since your last visit? Hospitalized since your last visit? NO    2. Have you seen or consulted any other health care providers outside of the [facility] since your last visit? Include any pap smears or colon screening. NO      Advance Care Planning    In the event something were to happen to you and you were unable to speak on your behalf, do you have an Advance Directive/ Living Will in place stating your wishes? NO    If yes, do we have a copy on file NO    If no, would you like information YES    My Chart     My chart gives you direct online access to portions of the electronic medical record (EMR) where your doctor stores your health information (ie, lab results, appointment information, medications, immunizations, and more. It is free. Would you like to set up your my chart? NO pt preferred pamphlet      Today's provider has been notified of reason for visit, vitals and flowsheets obtained on patients.      Reviewed record In preparation for visit, huddled with provider and have obtained necessary documentation      Health Maintenance Due   Topic    PAP AKA CERVICAL CYTOLOGY        [unfilled]  Wt Readings from Last 3 Encounters:   09/10/18 111 lb 14.4 oz (50.8 kg)   18 109 lb (49.4 kg)     Temp Readings from Last 3 Encounters:   09/10/18 97.7 °F (36.5 °C) (Temporal)   18 98.1 °F (36.7 °C) (Oral)     BP Readings from Last 3 Encounters:   09/10/18 102/60   18 96/62     Pulse Readings from Last 3 Encounters:   09/10/18 79   18 70     Vitals:    09/10/18 1552   BP: 102/60   Pulse: 79   Resp: 18   Temp: 97.7 °F (36.5 °C)   TempSrc: Temporal   SpO2: 99%   Weight: 111 lb 14.4 oz (50.8 kg)   Height: 5' 4\" (1.626 m)   PainSc:   0 - No pain   LMP: 2018 Learning Assessment:  :     Learning Assessment 9/10/2018   PRIMARY LEARNER Patient   HIGHEST LEVEL OF EDUCATION - PRIMARY LEARNER  > 4 YEARS OF COLLEGE   BARRIERS PRIMARY LEARNER NONE   CO-LEARNER CAREGIVER No   PRIMARY LANGUAGE ENGLISH   LEARNER PREFERENCE PRIMARY PICTURES   ANSWERED BY patient   RELATIONSHIP SELF       Depression Screening:  :     PHQ over the last two weeks 9/10/2018   Little interest or pleasure in doing things Not at all   Feeling down, depressed, irritable, or hopeless Not at all   Total Score PHQ 2 0       Fall Risk Assessment:  :     Fall Risk Assessment, last 12 mths 9/10/2018   Able to walk? Yes   Fall in past 12 months? No       Abuse Screening:  :     Abuse Screening Questionnaire 9/10/2018   Do you ever feel afraid of your partner? N   Are you in a relationship with someone who physically or mentally threatens you? N   Is it safe for you to go home? Y       ADL Screening:  :     ADL Assessment 9/10/2018   Feeding yourself No Help Needed   Getting from bed to chair No Help Needed   Getting dressed No Help Needed   Bathing or showering No Help Needed   Walk across the room (includes cane/walker) No Help Needed   Using the telphone No Help Needed   Taking your medications No Help Needed   Preparing meals No Help Needed   Managing money (expenses/bills) No Help Needed   Moderately strenuous housework (laundry) No Help Needed   Shopping for personal items (toiletries/medicines) No Help Needed   Shopping for groceries No Help Needed   Driving No Help Needed   Climbing a flight of stairs No Help Needed   Getting to places beyond walking distances No Help Needed         Medication reconciliation up to date and corrected with patient at this time.

## 2019-03-11 ENCOUNTER — OFFICE VISIT (OUTPATIENT)
Dept: FAMILY MEDICINE CLINIC | Age: 29
End: 2019-03-11

## 2019-03-11 VITALS
RESPIRATION RATE: 20 BRPM | DIASTOLIC BLOOD PRESSURE: 62 MMHG | HEART RATE: 84 BPM | BODY MASS INDEX: 18.37 KG/M2 | WEIGHT: 107.6 LBS | SYSTOLIC BLOOD PRESSURE: 100 MMHG | HEIGHT: 64 IN | TEMPERATURE: 98.4 F | OXYGEN SATURATION: 99 %

## 2019-03-11 NOTE — PROGRESS NOTES
Shruti [place]  Identified pt with two pt identifiers(name and ). Chief Complaint   Patient presents with    Physical     Rm 4     1. Have you been to the ER, urgent care clinic since your last visit?n   Hospitalized since your last visit? n     2. Have you seen or consulted any other health care providers outside of the [address] [place] [address] since your last visit? Include any pap smears or colon screening. n       Advance Care Planning    In the event something were to happen to you and you were unable to speak on your behalf, do you have an Advance Directive/ Living Will in place stating your wishes? NO    If yes, do we have a copy on file NO    If no, would you like information NO    Medication reconciliation up to date and corrected with patient at this time. Today's provider has been notified of reason for visit, vitals and flowsheets obtained on patients. Reviewed record in preparation for visit, huddled with provider and have obtained necessary documentation.       Health Maintenance Due   Topic    Influenza Age 5 to Adult        Wt Readings from Last 3 Encounters:   19 107 lb 9.6 oz (48.8 kg)   09/10/18 111 lb 14.4 oz (50.8 kg)   18 109 lb (49.4 kg)     Temp Readings from Last 3 Encounters:   19 98.4 °F (36.9 °C) (Oral)   09/10/18 97.7 °F (36.5 °C) (Temporal)   18 98.1 °F (36.7 °C) (Oral)     BP Readings from Last 3 Encounters:   19 100/62   09/10/18 102/60   18 96/62     Pulse Readings from Last 3 Encounters:   19 84   09/10/18 79   18 70     Vitals:    19 1649   BP: 100/62   Pulse: 84   Resp: 20   Temp: 98.4 °F (36.9 °C)   TempSrc: Oral   SpO2: 99%   Weight: 107 lb 9.6 oz (48.8 kg)   Height: 5' 4\" (1.626 m)   PainSc:   0 - No pain   LMP: 2019         Learning Assessment:  :     Learning Assessment 9/10/2018   PRIMARY LEARNER Patient   HIGHEST LEVEL OF EDUCATION - PRIMARY LEARNER  > 4 YEARS OF COLLEGE   BARRIERS PRIMARY LEARNER NONE   CO-LEARNER CAREGIVER No   PRIMARY LANGUAGE ENGLISH   LEARNER PREFERENCE PRIMARY PICTURES   ANSWERED BY patient   RELATIONSHIP SELF       Depression Screening:  :     3 most recent PHQ Screens 3/11/2019   Little interest or pleasure in doing things Not at all   Feeling down, depressed, irritable, or hopeless Not at all   Total Score PHQ 2 0       No flowsheet data found. Fall Risk Assessment:  :     Fall Risk Assessment, last 12 mths 9/10/2018   Able to walk? Yes   Fall in past 12 months? No       Abuse Screening:  :     Abuse Screening Questionnaire 9/10/2018   Do you ever feel afraid of your partner? N   Are you in a relationship with someone who physically or mentally threatens you? N   Is it safe for you to go home? Y       ADL Screening:  :     ADL Assessment 3/11/2019   Feeding yourself No Help Needed   Getting from bed to chair No Help Needed   Getting dressed No Help Needed   Bathing or showering No Help Needed   Walk across the room (includes cane/walker) No Help Needed   Using the telphone No Help Needed   Taking your medications No Help Needed   Preparing meals No Help Needed   Managing money (expenses/bills) No Help Needed   Moderately strenuous housework (laundry) No Help Needed   Shopping for personal items (toiletries/medicines) No Help Needed   Shopping for groceries No Help Needed   Driving No Help Needed   Climbing a flight of stairs No Help Needed   Getting to places beyond walking distances No Help Needed           BMI:  Weight Metrics 3/11/2019 9/10/2018 2/16/2018   Weight 107 lb 9.6 oz 111 lb 14.4 oz 109 lb   BMI 18.47 kg/m2 19.21 kg/m2 18.71 kg/m2           Medication reconciliation up to date and corrected with patient at this time.